# Patient Record
Sex: MALE | Race: WHITE | NOT HISPANIC OR LATINO | RURAL
[De-identification: names, ages, dates, MRNs, and addresses within clinical notes are randomized per-mention and may not be internally consistent; named-entity substitution may affect disease eponyms.]

---

## 2021-02-11 ENCOUNTER — OFFICE VISIT (OUTPATIENT)
Dept: RURAL CLINIC 2 | Facility: CLINIC | Age: 46
End: 2021-02-11
Payer: COMMERCIAL

## 2021-02-11 ENCOUNTER — DASHBOARD ENCOUNTERS (OUTPATIENT)
Age: 46
End: 2021-02-11

## 2021-02-11 ENCOUNTER — WEB ENCOUNTER (OUTPATIENT)
Dept: RURAL CLINIC 2 | Facility: CLINIC | Age: 46
End: 2021-02-11

## 2021-02-11 DIAGNOSIS — R17 ELEVATED BILIRUBIN: ICD-10-CM

## 2021-02-11 DIAGNOSIS — F19.10 DRUG ABUSE: ICD-10-CM

## 2021-02-11 DIAGNOSIS — E11.9 DIABETES: ICD-10-CM

## 2021-02-11 DIAGNOSIS — B17.9 ACUTE HEPATITIS: ICD-10-CM

## 2021-02-11 DIAGNOSIS — R94.5 ABNORMAL LFTS: ICD-10-CM

## 2021-02-11 PROBLEM — 111552007 DIABETES MELLITUS WITHOUT COMPLICATION: Status: ACTIVE | Noted: 2021-02-11

## 2021-02-11 PROBLEM — 26416006 DRUG ABUSE: Status: ACTIVE | Noted: 2021-02-11

## 2021-02-11 PROCEDURE — G9903 PT SCRN TBCO ID AS NON USER: HCPCS | Performed by: INTERNAL MEDICINE

## 2021-02-11 PROCEDURE — G8427 DOCREV CUR MEDS BY ELIG CLIN: HCPCS | Performed by: INTERNAL MEDICINE

## 2021-02-11 PROCEDURE — G8417 CALC BMI ABV UP PARAM F/U: HCPCS | Performed by: INTERNAL MEDICINE

## 2021-02-11 PROCEDURE — 99204 OFFICE O/P NEW MOD 45 MIN: CPT | Performed by: INTERNAL MEDICINE

## 2021-02-11 PROCEDURE — G8482 FLU IMMUNIZE ORDER/ADMIN: HCPCS | Performed by: INTERNAL MEDICINE

## 2021-02-11 RX ORDER — SERTRALINE HCL 100 MG
TABLET ORAL
Qty: 0 | Refills: 0 | Status: ACTIVE | COMMUNITY
Start: 1900-01-01

## 2021-02-11 RX ORDER — GLYBURIDE 5 MG/1
TABLET ORAL
Qty: 0 | Refills: 0 | Status: ACTIVE | COMMUNITY
Start: 1900-01-01

## 2021-02-11 RX ORDER — METFORMIN HYDROCHLORIDE 1000 MG/1
TAKE 1 TABLET (1,000 MG) BY ORAL ROUTE 2 TIMES PER DAY WITH MORNING AND EVENING MEALS TABLET, COATED ORAL 2
Qty: 0 | Refills: 0 | Status: ACTIVE | COMMUNITY
Start: 1900-01-01

## 2021-02-11 RX ORDER — LISINOPRIL 10 MG/1
TABLET ORAL
Qty: 0 | Refills: 0 | Status: ACTIVE | COMMUNITY
Start: 1900-01-01

## 2021-02-11 RX ORDER — ALPRAZOLAM 0.5 MG
TABLET ORAL
Qty: 0 | Refills: 0 | Status: ACTIVE | COMMUNITY
Start: 1900-01-01

## 2021-02-11 RX ORDER — TAMSULOSIN HYDROCHLORIDE 0.4 MG/1
CAPSULE ORAL
Qty: 0 | Refills: 0 | Status: ACTIVE | COMMUNITY
Start: 1900-01-01

## 2021-02-11 RX ORDER — LEDIPASVIR AND SOFOSBUVIR 90; 400 MG/1; MG/1
TAKE 1 TABLET BY ORAL ROUTE ONCE DAILY FOR 8 WEEKS TABLET, FILM COATED ORAL 1
Qty: 56 | Refills: 0 | Status: ACTIVE | COMMUNITY
Start: 2017-06-07

## 2021-02-11 NOTE — HPI-TODAY'S VISIT:
The patient comes to the office today for evaluation of hepatitis-C.  I saw him about 4 years ago for evaluation of the same problem.  At that time the patient did have a positive hepatitis-C antibody but his PCR was negative.  He also had immunity to hepatitis a and B. In January of 2021 he was admitted to [facility] with abdominal pain and nausea.  He had markedly elevated AST and ALT at around 2:00 p.m. and 2200 respectively.  Total bilirubin was elevated at 2.6.  Acute hepatitis panel was negative aside from hepatitis-C antibody that would expect to be always positive given his prior history of hepatitis-C.  I do not see a quantitative hepatitis-C PCR.  He does unfortunately, continued to use IV drugs.  His urine drug screen was positive for opiates and methamphetamines on admission to the hospital.  He was observed and treated conservatively and then a follow-up liver panel was noted to have down trending liver enzymes and total bilirubin. -   He does endorse return to IV drug use.  He used IV heroin about 2 weeks prior to this episode of hepatitis that required admission to the hospital.